# Patient Record
Sex: MALE | Race: WHITE | URBAN - METROPOLITAN AREA
[De-identification: names, ages, dates, MRNs, and addresses within clinical notes are randomized per-mention and may not be internally consistent; named-entity substitution may affect disease eponyms.]

---

## 2020-01-09 ENCOUNTER — HOSPITAL ENCOUNTER (OUTPATIENT)
Dept: BEHAVIORAL HEALTH | Facility: CLINIC | Age: 55
Discharge: HOME | End: 2020-01-09
Attending: SOCIAL WORKER | Admitting: SOCIAL WORKER
Payer: COMMERCIAL

## 2020-01-09 VITALS
SYSTOLIC BLOOD PRESSURE: 147 MMHG | WEIGHT: 230 LBS | DIASTOLIC BLOOD PRESSURE: 112 MMHG | HEART RATE: 99 BPM | HEIGHT: 72 IN | BODY MASS INDEX: 31.15 KG/M2

## 2020-01-09 PROCEDURE — H0001 ALCOHOL AND/OR DRUG ASSESS: HCPCS | Mod: HF | Performed by: COUNSELOR

## 2020-01-09 RX ORDER — ASPIRIN 81 MG/1
81 TABLET ORAL DAILY
COMMUNITY

## 2020-01-09 RX ORDER — ESCITALOPRAM OXALATE 10 MG/1
10 TABLET ORAL DAILY
COMMUNITY

## 2020-01-09 RX ORDER — ATORVASTATIN CALCIUM 20 MG/1
20 TABLET, FILM COATED ORAL DAILY
COMMUNITY

## 2020-01-09 ASSESSMENT — ANXIETY QUESTIONNAIRES
6. BECOMING EASILY ANNOYED OR IRRITABLE: MORE THAN HALF THE DAYS
GAD7 TOTAL SCORE: 17
1. FEELING NERVOUS, ANXIOUS, OR ON EDGE: NEARLY EVERY DAY
5. BEING SO RESTLESS THAT IT IS HARD TO SIT STILL: MORE THAN HALF THE DAYS
3. WORRYING TOO MUCH ABOUT DIFFERENT THINGS: NEARLY EVERY DAY
4. TROUBLE RELAXING: NEARLY EVERY DAY
2. NOT BEING ABLE TO STOP OR CONTROL WORRYING: MORE THAN HALF THE DAYS
7. FEELING AFRAID AS IF SOMETHING AWFUL MIGHT HAPPEN: MORE THAN HALF THE DAYS
IF YOU CHECKED OFF ANY PROBLEMS ON THIS QUESTIONNAIRE, HOW DIFFICULT HAVE THESE PROBLEMS MADE IT FOR YOU TO DO YOUR WORK, TAKE CARE OF THINGS AT HOME, OR GET ALONG WITH OTHER PEOPLE: EXTREMELY DIFFICULT

## 2020-01-09 ASSESSMENT — MIFFLIN-ST. JEOR: SCORE: 1921.27

## 2020-01-09 ASSESSMENT — PAIN SCALES - GENERAL: PAINLEVEL: NO PAIN (0)

## 2020-01-09 ASSESSMENT — PATIENT HEALTH QUESTIONNAIRE - PHQ9: SUM OF ALL RESPONSES TO PHQ QUESTIONS 1-9: 17
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
## 2020-01-09 NOTE — PROGRESS NOTES
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 the person has had any success in dealing with them.     - Cravings - No and Yes.   - Triggers - Things always bring him back to it, like the time 4pm    3. Have you been treated for alcohol/other drug abuse/dependence?     No    4. Support group participation: Have you/do you attend support group meetings to reduce/stop your alcohol/drug use? How recently? What was your experience? Are you willing to restart? If the person has not participated, is he or she willing?     He went to  in his 20s. He has continued to do therapy on and off. He wrote his thesis on men's group. Last spring, he liked being in the hospital groups, more from a clinician standpoint than as a patient. He has spent time in the hospital because of not doing self-care.      5. What would assist you in staying sober/straight?     He can see the benefits of inpatient treatment.     Dimension V Ratings   Relapse/Continued Use/Continued problem potential - The placing authority must use the criteria in Dimension V to determine a client s relapse, continued use, and continued problem potential.   RISK DESCRIPTIONS - Severity ratin No awareness of the negative impact of mental health  problems or substance abuse. No coping skills to arrest mental health or addiction illnesses, or prevent relapse.    REASONS SEVERITY WAS ASSIGNED - (What information did the person provide that indicates his or her understanding of relapse issues? What about the person s experience indicates how prone he or she is to relapse? What coping skills does the person have that decrease relapse potential?)      Patient denied past treatments and support group attendance. He reported having minimal sober time. He has tried to quit using and drinking in the past but returned to use. Patient lacks knowledge of the addiction cycle, use pattern, warning signs, and triggers. He lacks impulse control, sober coping skills, and long-term sober maintenance skills. Patient is at a high risk for relapse/continued use. He has undertreated co-occurring mental health and substance use issues, which places him at higher risk for continued use.      DIMENSION VI - Recovery Environment   1. Are you employed/attending school? Tell me about that.     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 He has been in some form of executive leadership for 10 years and it has stressed him out and impacted his health. Patient denied that use has negatively impacted his work. He stated that he does well at his job, everybody loves him. He doesn't get angry. His marital spats are only when he is sober.        2A. Describe a typical day; evening for you. Work, school, social, leisure, volunteer, spiritual practices. Include time spent obtaining, using, recovering from drugs or alcohol. (DSM)     He has a full days of work and meetings.     Please describe what leisure activities have been associated with your substance abuse:     He like to hunt, fish, sporting, and uses alcohol or other substances. He will pre-party drink before he goes out with wife. He  "doesn't hide alcohol, but hides how much he uses.    2B. How often do you spend more time than you planned using or use more than you planned? (DSM)     He always bargains with himself about how much he will drink. When he feels the effects, after 2 or 3 large shots, then has water.     3. How important is using to your social connections? Do many of your family or friends use?     Very. He has formed business and social relationships around alcohol. He found people that like to drink with him and have similar habits. He doesn't get into legal trouble. He surrounds himself with other professionals who use marijuana.    4A. Are you currently in a significant relationship?      for 22 years.    4C. Sexual Orientation:     Heterosexual    5A. Who do you live with?      The patient lives with his wife and children    5B. Tell me about their alcohol/drug use and mental health issues.     He \" into a happy hour family.\" His wife sees it as typical.     5C. Are you concerned for your safety there? No    5D. Are you concerned about the safety of anyone else who lives with you? No    6A. Do you have children who live with you?     He has 3 boys ages 20, 18, 15. They are an active family and in sports. He wants to be able to grow old with them.    6B. Do you have children who do not live with you?     No    7A. Who supports you in making changes in your alcohol or drug use? Would you like your family to participate in your treatment? If so, how? What are they willing to do to support you? Who is upset or angry about you making changes in your alcohol or drug use? How big a problem is this for you?      His wife and she wants to be a part of treatment.    7B. This table is provided to record information about the person s relationships and available support It is not necessary to ask each item; only to get a comprehensive picture of their support system.  How often can you count on the following people when you " need someone?   Partner / Spouse Usually supportive   Parent(s)/Aunt(s)/Uncle(s)/Grandparents The patient's parents and other family members are .   Sibling(s)/Cousin(s) Rarely supportive   Child(arlyn) Usually supportive   Other relative(s) The patient doesn't have any current contact with other relatives.   Friend(s)/neighbor(s) Usually supportive   Child(arlyn) s father(s)/mother(s) Usually supportive   Support group member(s) The patient denied having any current involvement with 12-step or other support group meetings.   Community of orlin members The patient denied having any current involvement with community orlin members.   /counselor/therapist/healer Usually supportive   Other (specify) No     8A. What is your current living situation?     The patient lives with his wife and children    8B. What is your long term plan for where you will be living?     No plans to move.     8C. Tell me about your living environment/neighborhood? Ask enough follow up questions to determine safety, criminal activity, availability of alcohol and drugs, supportive or antagonistic to the person making changes.      Patient did not indicate concerns in his neighborhood.    9. Criminal justice history: Gather current/recent history and any significant history related to substance use--Arrests? Convictions? Circumstances? Alcohol or drug involvement? Sentences? Still on probation or parole? Expectations of the court? Current court order? Any sex offenses - lifetime? What level? (DSM)    - Possession of drugs - age 19 arrested for cocaine. It is not on his record. He did probation.   - Patient denied current legal involvement.    10. What obstacles exist to participating in treatment? (Time off work, childcare, funding, transportation, pending assisted time, living situation)     The patient denied having any obstacles for participating in substance abuse treatment.    Dimension VI Ratings   Recovery environment - The  placing authority must use the criteria in Dimension VI to determine a client s recovery environment.   RISK DESCRIPTIONS - Severity ratin Client is engaged in structured, meaningful activity, but peers, family, significant other, and living environment are unsupportive, or there is criminal justice involvement by the client or among the client's peers, significant others, or in the client's living environment.    REASONS SEVERITY WAS ASSIGNED - (What support does the person have for making changes? What structure/stability does the person have in his or her daily life that will increase the likelihood that changes can be sustained? What problems exist in the person s environment that will jeopardize getting/staying clean and sober?)     Patient lives with his wife and children. His wife threatened to move out and divorce. He reported that most of his use of alcohol or marijuana has been done with other professionals. Patient lacks a current sober support network. He denied having any concerns regarding immediate living environment or neighborhood. Patient is employed full-time as a director. He has daily structure and meaningful activities. Patient denied legal involvement.      Client Choice/Exceptions     What is your cultural identification? White    Would you like services specific to language, age, gender, culture, Restorationist preference, race, ethnicity, sexual orientation or disability?  No    What particular treatment choices and options would you like to have? [facility]     Do you have a preference for a particular treatment program? [facility]     Criteria for Diagnosis     Criteria for Diagnosis  DSM-5 Criteria for Substance Use Disorder  Instructions: Determine whether the client currently meets the criteria for Substance Use Disorder using the diagnostic criteria in the DSM-V pp.[phone]. Current means during the most recent 12 months outside a facility that controls access to  substances    Category of Substance Severity (ICD-10 Code / DSM 5 Code)     Alcohol Use Disorder Severe  (10.20) (303.90)     Cannabis Use Disorder Severe   (F12.20) (304.30)     Hallucinogen Use Disorder The patient does not meet the criteria for a Hallucinogen use disorder.   Inhalant Use Disorder The patient does not meet the criteria for an Inhalant use disorder.   Opioid Use Disorder The patient does not meet the criteria for an Opioid use disorder.   Sedative, Hypnotic, or Anxiolytic Use Disorder The patient does not meet the criteria for a Sedative/Hypnotic use disorder.   Stimulant Related Disorder Mild  (F14.10) (305.60) Cocaine     Tobacco Use Disorder The patient does not meet the criteria for a Tobacco use disorder.   Other (or unknown) Substance Use Disorder The patient does not meet the criteria for a Other (or unknown) Substance use disorder.     Collateral Contact Summary   Number of contacts made: 0  Contact with referring person:  No  If court related records were reviewed, summarize here: No court records had been reviewed at the time of this documentation.    Rule 25 Assessment Summary and Plan   's Recommendation    It is recommended that patient:  1). Participate in and complete the lodging/residential substance use treatment program at [facility].   2). Follow all subsequent recommendations of the substance use treatment providers.   3). Abstain from alcohol and all mood-altering substances, except as prescribed. Take all medications as prescribed.   4). Attend AA at least twice weekly and obtain a male sponsor for additional sober supports. Consider attending Al-Anon to address effects of alcohol from family of origin.  5). Continue with individual mental health therapy.   6). Have a mental health evaluation to determine accurate diagnoses and if different psychotropic medications would be effective. Discuss with a doctor/psychiatrist the use of Naltrexone, Campral,  or Antabuse to decrease cravings and assist with sobriety.     Collateral Contacts     Name:    Lou Padilla Relationship:    Wife Phone Number:    [phone] Releases:    Yes     ateral Contact  Criteria for Diagnosis     A problematic pattern of alcohol/drug use leading to clinically significant impairment or distress, as manifested by at least two of the following, occurring within a 12-month period: 10/11    1.) Alcohol/drug is often taken in larger amounts or over a longer period than was intended.  2.) There is a persistent desire or unsuccessful efforts to cut down or control alcohol/drug use  3.) A great deal of time is spent in activities necessary to obtain alcohol, use alcohol, or recover from its effects.  4.) Craving, or a strong desire or urge to use alcohol/drug  5.) Recurrent alcohol/drug use resulting in a failure to fulfill major role obligations at work, school or home.  6.) Continued alcohol use despite having persistent or recurrent social or interpersonal problems caused or exacerbated by the effects of alcohol/drug.  7.) Important social, occupational, or recreational activities are given up or reduced because of alcohol/drug use.  9.) Alcohol/drug use is continued despite knowledge of having a persistent or recurrent physical or psychological problem that is likely to have been caused or exacerbated by alcohol.  10.) Tolerance, as defined by either of the following: A need for markedly increased amounts of alcohol/drug to achieve intoxication or desired effect.  11.) Withdrawal, as manifested by either of the following: The characteristic withdrawal syndrome for alcohol/drug (refer to Criteria A and B of the criteria set for alcohol/drug withdrawal).    Specify if: In early remission:  After full criteria for alcohol/drug use disorder were previously met, none of the criteria for alcohol/drug use disorder have been met for at least 3 months but for less than 12 months (with the exception  that Criterion A4,  Craving or a strong desire or urge to use alcohol/drug  may be met).     In sustained remission:   After full criteria for alcohol use disorder were previously met, none of the criteria for alcohol/drug use disorder have been met at any time during a period of 12 months or longer (with the exception that Criterion A4,  Craving or strong desire or urge to use alcohol/drug  may be met).   Specify if:   This additional specifier is used if the individual is in an environment where access to alcohol is restricted.    Mild: Presence of 2-3 symptoms  Moderate: Presence of 4-5 symptoms  Severe: Presence of 6 or more symptoms

## 2020-01-10 ASSESSMENT — ANXIETY QUESTIONNAIRES: GAD7 TOTAL SCORE: 17
